# Patient Record
Sex: FEMALE | Race: WHITE
[De-identification: names, ages, dates, MRNs, and addresses within clinical notes are randomized per-mention and may not be internally consistent; named-entity substitution may affect disease eponyms.]

---

## 2019-02-28 ENCOUNTER — HOSPITAL ENCOUNTER (OUTPATIENT)
Dept: HOSPITAL 63 - US | Age: 69
Discharge: HOME | End: 2019-02-28
Attending: FAMILY MEDICINE
Payer: MEDICARE

## 2019-02-28 DIAGNOSIS — M79.605: ICD-10-CM

## 2019-02-28 DIAGNOSIS — R60.0: ICD-10-CM

## 2019-02-28 DIAGNOSIS — L03.116: Primary | ICD-10-CM

## 2019-02-28 PROCEDURE — 93971 EXTREMITY STUDY: CPT

## 2019-02-28 NOTE — RAD
EXAM: Left lower extremity venous Doppler.

 

HISTORY: Left lower extremity pain/swelling after trauma.

 

COMPARISON: None.

 

FINDINGS: Grayscale and Doppler analysis of the left lower extremity deep 

venous system was performed with graded compression and augmentation. The 

common femoral, greater saphenous, superficial femoral, popliteal and calf

veins were assessed.

 

There is no evidence of deep venous thrombosis. Along the posterior calf 

at site of concern, there are small irregular subcutaneous fluid 

collections suggesting resolving hematomas. The largest individual 

collection measures 1.4 x 1.1 cm. The entire involved region spans 4.6 cm.

 

IMPRESSION: 

1. No evidence of deep venous thrombosis.

2. Subcutaneous fluid collections at the site of concern are likely 

resolving hematomas. Correlate clinically to exclude infection.

 

Electronically signed by: BRIGITTE Blanton MD (2/28/2019 11:25 PM) 

[facility]